# Patient Record
Sex: MALE | Race: WHITE | NOT HISPANIC OR LATINO | Employment: FULL TIME | URBAN - METROPOLITAN AREA
[De-identification: names, ages, dates, MRNs, and addresses within clinical notes are randomized per-mention and may not be internally consistent; named-entity substitution may affect disease eponyms.]

---

## 2023-09-16 ENCOUNTER — HOSPITAL ENCOUNTER (EMERGENCY)
Facility: CLINIC | Age: 28
Discharge: HOME | End: 2023-09-16
Attending: PHYSICIAN ASSISTANT | Admitting: PHYSICIAN ASSISTANT
Payer: COMMERCIAL

## 2023-09-16 VITALS
HEART RATE: 74 BPM | DIASTOLIC BLOOD PRESSURE: 88 MMHG | OXYGEN SATURATION: 97 % | SYSTOLIC BLOOD PRESSURE: 132 MMHG | RESPIRATION RATE: 16 BRPM | TEMPERATURE: 97.6 F

## 2023-09-16 DIAGNOSIS — H57.89 IRRITATION OF LEFT EYE: ICD-10-CM

## 2023-09-16 PROCEDURE — 250N000009 HC RX 250: Performed by: PHYSICIAN ASSISTANT

## 2023-09-16 PROCEDURE — 99203 OFFICE O/P NEW LOW 30 MIN: CPT | Performed by: PHYSICIAN ASSISTANT

## 2023-09-16 PROCEDURE — G0463 HOSPITAL OUTPT CLINIC VISIT: HCPCS | Performed by: PHYSICIAN ASSISTANT

## 2023-09-16 RX ORDER — KETOROLAC TROMETHAMINE 5 MG/ML
1 SOLUTION OPHTHALMIC 4 TIMES DAILY PRN
Qty: 5 ML | Refills: 0 | Status: SHIPPED | OUTPATIENT
Start: 2023-09-16 | End: 2023-09-19

## 2023-09-16 RX ORDER — PROPARACAINE HYDROCHLORIDE 5 MG/ML
1 SOLUTION/ DROPS OPHTHALMIC ONCE
Status: DISCONTINUED | OUTPATIENT
Start: 2023-09-16 | End: 2023-09-16

## 2023-09-16 RX ORDER — TETRACAINE HYDROCHLORIDE 5 MG/ML
1 SOLUTION OPHTHALMIC ONCE
Status: COMPLETED | OUTPATIENT
Start: 2023-09-16 | End: 2023-09-16

## 2023-09-16 RX ADMIN — TETRACAINE HYDROCHLORIDE 1 DROP: 5 SOLUTION OPHTHALMIC at 16:29

## 2023-09-16 ASSESSMENT — VISUAL ACUITY
OU: 1
OU: 20/20;WITHOUT CORRECTIVE LENSES
OS: 20/20;WITHOUT CORRECTIVE LENSES
OD: 20/20;WITHOUT CORRECTIVE LENSES

## 2023-09-16 ASSESSMENT — ENCOUNTER SYMPTOMS
EYE ITCHING: 1
PHOTOPHOBIA: 0
EYE PAIN: 1
CONSTITUTIONAL NEGATIVE: 1
EYE REDNESS: 1
EYE DISCHARGE: 0
RESPIRATORY NEGATIVE: 1
CARDIOVASCULAR NEGATIVE: 1

## 2023-09-16 ASSESSMENT — ACTIVITIES OF DAILY LIVING (ADL): ADLS_ACUITY_SCORE: 35

## 2023-09-16 NOTE — ED TRIAGE NOTES
Pt reports that he believes to have saw dust in the left eye . Pt states he was walking under the deck and a family member was drilling and the wind blew, thinking that's how saw dust ended up in his eye. Pt states he tried irrigating the eye at home.   Incident happened today       Visual acuity today with no corrective lenses:   Both 20/20  Left 20/20   Right 20/20

## 2023-09-16 NOTE — DISCHARGE INSTRUCTIONS
To apply eye drops, tip head back, grasp lower eyelid with thumb and index finger, and squeeze bottle to place drop(s) in the eye. Never allow the dropper bottle to touch the eye.    Do not wear contacts for 1 week and discard your current pair of contacts.      Recommend applying cool compress to affected eye(s) four times per day to relieve irritation and itching. Recommend Zyrtec or Claritin for itching.  May use artificial tears for symptomatic relief of irritation/itching as well.    Recommend follow-up with [facility] in 2 days if symptoms persist which is located within the [facility].  [facility] phone number is [phone].  Please call to schedule an appointment.     Advise urgent follow up if you have sudden decreased visual acuity, sudden vision changes, increased pain, severely worsening headaches, redness/swelling/tenderness around the eyes.

## 2023-09-16 NOTE — ED PROVIDER NOTES
History     Chief Complaint   Patient presents with    Eye Problem     HPI  Alan Garcia is a 28 year old male who presents due to concern for having a foreign body in the left eye.  Believes he has sawdust in the left eye.  He was at home building a deck today.  States that it piece of sawdust blew into the left eye.  He tried irrigating the eye at home with water, with artificial tears and taking a shower but has had limited relief.  Continues to experience irritation in the left eye.  Has no deficits in visual acuity, normal field of vision.  No tenderness or pain around the eyes.  No trauma or facial injuries.    Allergies:  Allergies   Allergen Reactions    Penicillins Rash       Problem List:    There are no problems to display for this patient.       Past Medical History:    No past medical history on file.    Past Surgical History:    No past surgical history on file.    Family History:    No family history on file.    Social History:  Marital Status:  Single [1]  Social History     Tobacco Use    Smoking status: Never   Substance Use Topics    Alcohol use: No    Drug use: No        Medications:    ketorolac (ACULAR) 0.5 % ophthalmic solution        Review of Systems   Constitutional: Negative.    HENT: Negative.     Eyes:  Positive for pain, redness and itching. Negative for photophobia, discharge and visual disturbance.   Respiratory: Negative.     Cardiovascular: Negative.        Physical Exam   BP: 132/88  Pulse: 74  Temp: 97.6  F (36.4  C)  Resp: 16  SpO2: 97 %      Physical Exam  Constitutional:       General: He is not in acute distress.     Appearance: Normal appearance. He is not ill-appearing, toxic-appearing or diaphoretic.   Eyes:      General: Vision grossly intact. No visual field deficit or scleral icterus.        Right eye: No discharge.         Left eye: No discharge.      Extraocular Movements: Extraocular movements intact.      Right eye: Normal extraocular motion and no nystagmus.       Left eye: Normal extraocular motion and no nystagmus.      Conjunctiva/sclera:      Right eye: Right conjunctiva is not injected. No chemosis, exudate or hemorrhage.     Left eye: Left conjunctiva is injected. No chemosis, exudate or hemorrhage.     Pupils: Pupils are equal, round, and reactive to light. Pupils are equal.      Right eye: Pupil is round, reactive and not sluggish.      Left eye: Pupil is round, reactive and not sluggish. No fluorescein uptake.      Funduscopic exam:     Right eye: Red reflex present.         Left eye: Red reflex present.  Musculoskeletal:      Cervical back: Normal range of motion and neck supple. No tenderness.   Neurological:      Mental Status: He is alert and oriented to person, place, and time.         ED Course                 Procedures              No results found for this or any previous visit (from the past 24 hour(s)).    Medications   tetracaine (PONTOCAINE) 0.5 % ophthalmic solution 1 drop (1 drop Left Eye $Given 9/16/23 4931)       Assessments & Plan (with Medical Decision Making)     The patient is a 28-year-old male who presents for evaluation of foreign body sensation in the left eye.  No foreign bodies or corneal abrasion is appreciated on exam.  No acute changes in vision, no flashes or floaters, no double vision.  No apparent trauma, no URI symptoms today.  Vital signs are reassuring, no worrisome findings on exam.      Recommend applying cool compress to affected eye(s) four times per day to relieve irritation and itching. Recommend Zyrtec or Claritin for itching.  May use artificial tears for symptomatic relief of irritation/itching as well.    Recommend follow-up with [facility] in 2 days if symptoms persist which is located within the [facility].  [facility] phone number is [phone].  Please call to schedule an appointment.     Advise urgent follow up if you have sudden decreased visual acuity, sudden vision changes, increased  pain, severely worsening headaches, redness/swelling/tenderness around the eyes.      I have reviewed the nursing notes.    I have reviewed the findings, diagnosis, plan and need for follow up with the patient.      New Prescriptions    KETOROLAC (ACULAR) 0.5 % OPHTHALMIC SOLUTION    Place 1 drop Into the left eye 4 times daily as needed       Final diagnoses:   Irritation of left eye       9/16/2023   [facility] EMERGENCY DEPT       Elmer Phelps PA-C  09/16/23 3898

## 2023-12-30 ENCOUNTER — HEALTH MAINTENANCE LETTER (OUTPATIENT)
Age: 28
End: 2023-12-30

## 2025-01-19 ENCOUNTER — HEALTH MAINTENANCE LETTER (OUTPATIENT)
Age: 30
End: 2025-01-19